# Patient Record
Sex: FEMALE | Race: WHITE | NOT HISPANIC OR LATINO | URBAN - METROPOLITAN AREA
[De-identification: names, ages, dates, MRNs, and addresses within clinical notes are randomized per-mention and may not be internally consistent; named-entity substitution may affect disease eponyms.]

---

## 2019-06-25 ENCOUNTER — AMBULATORY SURGICAL CENTER (OUTPATIENT)
Dept: URBAN - METROPOLITAN AREA SURGERY 3 | Facility: SURGERY | Age: 64
End: 2019-06-25
Payer: COMMERCIAL

## 2019-06-25 VITALS
WEIGHT: 147 LBS | SYSTOLIC BLOOD PRESSURE: 119 MMHG | HEART RATE: 94 BPM | OXYGEN SATURATION: 92 % | SYSTOLIC BLOOD PRESSURE: 120 MMHG | DIASTOLIC BLOOD PRESSURE: 56 MMHG | RESPIRATION RATE: 20 BRPM | TEMPERATURE: 97 F | HEIGHT: 67 IN | TEMPERATURE: 97 F | HEART RATE: 84 BPM | HEIGHT: 67 IN | OXYGEN SATURATION: 92 % | RESPIRATION RATE: 18 BRPM | WEIGHT: 147 LBS | HEART RATE: 92 BPM | DIASTOLIC BLOOD PRESSURE: 56 MMHG | DIASTOLIC BLOOD PRESSURE: 72 MMHG | SYSTOLIC BLOOD PRESSURE: 119 MMHG | OXYGEN SATURATION: 22 % | OXYGEN SATURATION: 92 % | SYSTOLIC BLOOD PRESSURE: 120 MMHG | RESPIRATION RATE: 16 BRPM | OXYGEN SATURATION: 22 % | DIASTOLIC BLOOD PRESSURE: 72 MMHG | RESPIRATION RATE: 18 BRPM | DIASTOLIC BLOOD PRESSURE: 85 MMHG | RESPIRATION RATE: 20 BRPM | HEART RATE: 94 BPM | OXYGEN SATURATION: 99 % | RESPIRATION RATE: 16 BRPM | WEIGHT: 147 LBS | TEMPERATURE: 97 F | SYSTOLIC BLOOD PRESSURE: 119 MMHG | OXYGEN SATURATION: 99 % | RESPIRATION RATE: 18 BRPM | DIASTOLIC BLOOD PRESSURE: 56 MMHG | RESPIRATION RATE: 20 BRPM | HEART RATE: 84 BPM | OXYGEN SATURATION: 22 % | HEIGHT: 67 IN | SYSTOLIC BLOOD PRESSURE: 120 MMHG | HEART RATE: 92 BPM | HEART RATE: 94 BPM | DIASTOLIC BLOOD PRESSURE: 72 MMHG | DIASTOLIC BLOOD PRESSURE: 85 MMHG | HEART RATE: 84 BPM | DIASTOLIC BLOOD PRESSURE: 85 MMHG | OXYGEN SATURATION: 99 % | RESPIRATION RATE: 16 BRPM | HEART RATE: 92 BPM

## 2019-06-25 DIAGNOSIS — Z12.11 ENCOUNTER FOR SCREENING FOR MALIGNANT NEOPLASM OF COLON: ICD-10-CM

## 2023-01-18 ENCOUNTER — OFFICE (OUTPATIENT)
Dept: URBAN - NONMETROPOLITAN AREA CLINIC 5 | Facility: CLINIC | Age: 68
End: 2023-01-18

## 2023-01-18 VITALS
RESPIRATION RATE: 18 BRPM | HEART RATE: 72 BPM | DIASTOLIC BLOOD PRESSURE: 80 MMHG | WEIGHT: 139 LBS | SYSTOLIC BLOOD PRESSURE: 161 MMHG | HEIGHT: 67 IN

## 2023-01-18 DIAGNOSIS — K80.50 CALCULUS OF BILE DUCT WITHOUT CHOLANGITIS OR CHOLECYSTITIS W: ICD-10-CM

## 2023-01-18 DIAGNOSIS — R74.8 ABNORMAL LEVELS OF OTHER SERUM ENZYMES: ICD-10-CM

## 2023-01-18 PROCEDURE — 99204 OFFICE O/P NEW MOD 45 MIN: CPT | Performed by: INTERNAL MEDICINE

## 2023-01-18 NOTE — SERVICEHPINOTES
Katherine Nicole   is a   68 yo  female   with a past medical history of thyroid cancer status post thyroidectomy and recently diagnosed alpha gal syndrome who presents to establish care for elevated liver enzymes.  Patient reports a history of a goiter in her thyroid which had been present for many years.  She underwent a partial thyroidectomy in July of 2021 and this revealed papillary cancer.  She completed a thyroidectomy in September of 2021.  She then was given radiation iodine therapy which made her quite sick.  She was then started on Synthroid which caused her a rash.  Her liver enzymes were found to be elevated in October.  Given persistence of the rash she was ultimately seen by Dr. Clark and was tested for alpha gal syndrome.  This was positive.  It was determined that Synthroid has lactose in it.  She was concerned she may have some other underlying condition contributing to her symptoms and was seen by the [facility] were no other diagnosis was given.  She has been going to [place] to receive Chinese acupuncture and with this has been able to eat meat in drink milk again.  She recently was started on Dupixent for the rash and when she gave herself an injection in her abdomen she had significant abdominal pain.  She was seen by physician who told her it potentially could be her gallbladder.  She ultimately underwent an ultrasound which revealed a "wall echo sign".  It was noted this would be consistent with cholelithiasis assuming the patient had not undergone a prior cholecystectomy.  It was also suspected there were several stones within the distal common bile duct but no common bile duct dilation was appreciated.  This was completed on December 2, 2022. in review she did have blood work on 11/29 which noted WBC 9.8, hemoglobin 13.3, platelets 269, sodium 140, potassium 4.5, chloride 102, CO2 29, BUN 11, creatinine 0.6, T bili 7.2, ,  , alk-phos 354, total protein 8.4, and albumin of 4.7.  She had a follow-up CMP in 12/8 and her T bili was back within normal range at 1.  AST, ALT, alk phos all remained elevated.  She denies a prior diagnosis of liver disease or family history of liver disease.  She denies any abdominal pain, jaundice, hematemesis, bright blood per rectum, melena, IV drug use, prior blood transfusions, or tattoos.

## 2023-02-02 ENCOUNTER — ON CAMPUS - OUTPATIENT (OUTPATIENT)
Dept: URBAN - NONMETROPOLITAN AREA HOSPITAL 28 | Facility: HOSPITAL | Age: 68
End: 2023-02-02

## 2023-02-02 DIAGNOSIS — K80.71 CALCULUS OF GALLBLADDER AND BILE DUCT WITHOUT CHOLECYSTITIS: ICD-10-CM

## 2023-02-02 DIAGNOSIS — K29.80 DUODENITIS WITHOUT BLEEDING: ICD-10-CM

## 2023-02-02 PROCEDURE — 43264 ERCP REMOVE DUCT CALCULI: CPT | Performed by: INTERNAL MEDICINE

## 2023-02-02 PROCEDURE — 43261 ENDO CHOLANGIOPANCREATOGRAPH: CPT | Mod: 59 | Performed by: INTERNAL MEDICINE

## 2023-02-02 PROCEDURE — 43262 ENDO CHOLANGIOPANCREATOGRAPH: CPT | Mod: 59 | Performed by: INTERNAL MEDICINE
